# Patient Record
Sex: FEMALE | Race: WHITE | URBAN - METROPOLITAN AREA
[De-identification: names, ages, dates, MRNs, and addresses within clinical notes are randomized per-mention and may not be internally consistent; named-entity substitution may affect disease eponyms.]

---

## 2018-10-05 ENCOUNTER — APPOINTMENT (OUTPATIENT)
Dept: URBAN - METROPOLITAN AREA SURGERY 9 | Age: 48
Setting detail: DERMATOLOGY
End: 2018-10-06

## 2018-10-05 DIAGNOSIS — L98.8 OTHER SPECIFIED DISORDERS OF THE SKIN AND SUBCUTANEOUS TISSUE: ICD-10-CM

## 2018-10-05 PROCEDURE — OTHER BOTOX: OTHER

## 2019-02-25 ENCOUNTER — APPOINTMENT (OUTPATIENT)
Dept: URBAN - METROPOLITAN AREA SURGERY 9 | Age: 49
Setting detail: DERMATOLOGY
End: 2019-02-25

## 2019-02-25 DIAGNOSIS — Z41.9 ENCOUNTER FOR PROCEDURE FOR PURPOSES OTHER THAN REMEDYING HEALTH STATE, UNSPECIFIED: ICD-10-CM

## 2019-02-25 PROCEDURE — OTHER OTHER: OTHER

## 2019-02-25 PROCEDURE — OTHER BOTOX: OTHER

## 2019-02-25 NOTE — PROCEDURE: BOTOX
Price (Use Numbers Only, No Special Characters Or $): 251 Price (Use Numbers Only, No Special Characters Or $): 928

## 2019-06-06 ENCOUNTER — APPOINTMENT (OUTPATIENT)
Dept: URBAN - METROPOLITAN AREA SURGERY 9 | Age: 49
Setting detail: DERMATOLOGY
End: 2019-06-06

## 2019-06-06 DIAGNOSIS — B00.1 HERPESVIRAL VESICULAR DERMATITIS: ICD-10-CM

## 2019-06-06 DIAGNOSIS — L23.7 ALLERGIC CONTACT DERMATITIS DUE TO PLANTS, EXCEPT FOOD: ICD-10-CM

## 2019-06-06 DIAGNOSIS — L98.8 OTHER SPECIFIED DISORDERS OF THE SKIN AND SUBCUTANEOUS TISSUE: ICD-10-CM

## 2019-06-06 DIAGNOSIS — Z41.9 ENCOUNTER FOR PROCEDURE FOR PURPOSES OTHER THAN REMEDYING HEALTH STATE, UNSPECIFIED: ICD-10-CM

## 2019-06-06 PROCEDURE — OTHER COUNSELING: OTHER

## 2019-06-06 PROCEDURE — OTHER PRESCRIPTION: OTHER

## 2019-06-06 PROCEDURE — OTHER PRODUCT LINE (PHARMACEUTICALS): OTHER

## 2019-06-06 PROCEDURE — OTHER BOTOX: OTHER

## 2019-06-06 PROCEDURE — 99213 OFFICE O/P EST LOW 20 MIN: CPT

## 2019-06-06 RX ORDER — TRIAMCINOLONE ACETONIDE 1 MG/G
OINTMENT TOPICAL
Qty: 1 | Refills: 0 | Status: ERX

## 2019-06-06 RX ORDER — VALACYCLOVIR HYDROCHLORIDE 1 G/1
TABLET, FILM COATED ORAL
Qty: 4 | Refills: 2 | Status: ERX

## 2019-06-06 ASSESSMENT — LOCATION ZONE DERM
LOCATION ZONE: LIP
LOCATION ZONE: LEG

## 2019-06-06 ASSESSMENT — LOCATION SIMPLE DESCRIPTION DERM
LOCATION SIMPLE: RIGHT LIP
LOCATION SIMPLE: RIGHT PRETIBIAL REGION

## 2019-06-06 ASSESSMENT — LOCATION DETAILED DESCRIPTION DERM
LOCATION DETAILED: RIGHT ORAL COMMISSURE
LOCATION DETAILED: RIGHT PROXIMAL PRETIBIAL REGION

## 2019-06-06 NOTE — HPI: SECONDARY COMPLAINT
How Severe Is This Condition?: mild
How Severe Is This Condition?: moderate
Additional History: yard work; thinks that it is probably poison ivy

## 2019-06-06 NOTE — PROCEDURE: BOTOX
Price (Use Numbers Only, No Special Characters Or $): 737 Price (Use Numbers Only, No Special Characters Or $): 313

## 2019-06-06 NOTE — PROCEDURE: PRODUCT LINE (PHARMACEUTICALS)
Product 36 Units: 0
Product 24 Price (In Dollars - Numeric Only, No Special Characters Or $): 0.00
Product 7 Application Directions: Use as directed.  Bleaching cream handout given
Render Product Pricing In Note: Yes
Name Of Product 5: Tretinion 0.025% 20 g
Name Of Product 3: Latisse 5ML x 2
Product 5 Price (In Dollars - Numeric Only, No Special Characters Or $): 84.00
Name Of Product 1: Latisse 3ML
Product 3 Price (In Dollars - Numeric Only, No Special Characters Or $): 300
Product 5 Application Directions: Apply to affected area daily. Handout given
Product 1 Price (In Dollars - Numeric Only, No Special Characters Or $): 120
Product 3 Application Directions: apply as directed
Detail Level: Zone
Name Of Product 6: Tretinoin 0.05%
Product 6 Units: 1
Product 1 Application Directions: apply qd to affected area
Product 6 Application Directions: apply to affected areas qhs.  Retinoid handout given.  discontinue if pregnancy suspected
Name Of Product 4: Tretinoin 0.1%
Product 6 Price (In Dollars - Numeric Only, No Special Characters Or $): 91.00
Name Of Product 2: Latisse 5ML
Product 4 Price (In Dollars - Numeric Only, No Special Characters Or $): 102.00
Name Of Product 7: Hydro-Q 4%
Product 2 Price (In Dollars - Numeric Only, No Special Characters Or $): 179.00
Product 7 Price (In Dollars - Numeric Only, No Special Characters Or $): 69.90
Product 4 Application Directions: apply qhs to face; Retinoid handout given to patient

## 2019-09-17 ENCOUNTER — APPOINTMENT (OUTPATIENT)
Dept: URBAN - METROPOLITAN AREA SURGERY 9 | Age: 49
Setting detail: DERMATOLOGY
End: 2019-09-17

## 2019-09-17 DIAGNOSIS — L82.1 OTHER SEBORRHEIC KERATOSIS: ICD-10-CM

## 2019-09-17 DIAGNOSIS — L23.7 ALLERGIC CONTACT DERMATITIS DUE TO PLANTS, EXCEPT FOOD: ICD-10-CM

## 2019-09-17 DIAGNOSIS — D22 MELANOCYTIC NEVI: ICD-10-CM

## 2019-09-17 DIAGNOSIS — L82.0 INFLAMED SEBORRHEIC KERATOSIS: ICD-10-CM

## 2019-09-17 PROBLEM — D48.5 NEOPLASM OF UNCERTAIN BEHAVIOR OF SKIN: Status: ACTIVE | Noted: 2019-09-17

## 2019-09-17 PROBLEM — D22.5 MELANOCYTIC NEVI OF TRUNK: Status: ACTIVE | Noted: 2019-09-17

## 2019-09-17 PROCEDURE — OTHER COUNSELING: OTHER

## 2019-09-17 PROCEDURE — 11102 TANGNTL BX SKIN SINGLE LES: CPT

## 2019-09-17 PROCEDURE — OTHER PRESCRIPTION: OTHER

## 2019-09-17 PROCEDURE — OTHER TREATMENT REGIMEN: OTHER

## 2019-09-17 PROCEDURE — OTHER BIOPSY BY SHAVE METHOD: OTHER

## 2019-09-17 PROCEDURE — OTHER REASSURANCE: OTHER

## 2019-09-17 PROCEDURE — 99213 OFFICE O/P EST LOW 20 MIN: CPT | Mod: 25

## 2019-09-17 RX ORDER — TRIAMCINOLONE ACETONIDE 1 MG/G
OINTMENT TOPICAL
Qty: 1 | Refills: 0 | Status: ERX | COMMUNITY
Start: 2019-09-17

## 2019-09-17 ASSESSMENT — LOCATION SIMPLE DESCRIPTION DERM
LOCATION SIMPLE: RIGHT PRETIBIAL REGION
LOCATION SIMPLE: LEFT PRETIBIAL REGION
LOCATION SIMPLE: RIGHT LOWER BACK
LOCATION SIMPLE: LEFT UPPER ARM
LOCATION SIMPLE: RIGHT THIGH
LOCATION SIMPLE: RIGHT FOREARM
LOCATION SIMPLE: LEFT FOREARM

## 2019-09-17 ASSESSMENT — LOCATION DETAILED DESCRIPTION DERM
LOCATION DETAILED: LEFT DISTAL PRETIBIAL REGION
LOCATION DETAILED: RIGHT VENTRAL PROXIMAL FOREARM
LOCATION DETAILED: RIGHT DISTAL PRETIBIAL REGION
LOCATION DETAILED: LEFT VENTRAL PROXIMAL FOREARM
LOCATION DETAILED: RIGHT SUPERIOR LATERAL MIDBACK
LOCATION DETAILED: LEFT ANTERIOR DISTAL UPPER ARM
LOCATION DETAILED: RIGHT ANTERIOR DISTAL THIGH

## 2019-09-17 ASSESSMENT — LOCATION ZONE DERM
LOCATION ZONE: ARM
LOCATION ZONE: TRUNK
LOCATION ZONE: LEG

## 2019-09-17 NOTE — HPI: SKIN LESION
Is This A New Presentation, Or A Follow-Up?: Skin Lesion
Additional History: Denies pain, itching or bleeding.
Additional History: It was irritated and sore one time, possibly from bra strap.

## 2019-11-12 ENCOUNTER — RX ONLY (RX ONLY)
Age: 49
End: 2019-11-12

## 2019-11-12 RX ORDER — TRETINOIN 0.5 MG/G
CREAM TOPICAL
Qty: 1 | Refills: 3 | COMMUNITY
Start: 2019-11-12

## 2020-09-09 ENCOUNTER — APPOINTMENT (OUTPATIENT)
Dept: URBAN - METROPOLITAN AREA SURGERY 9 | Age: 50
Setting detail: DERMATOLOGY
End: 2020-09-09

## 2020-09-09 DIAGNOSIS — L98.8 OTHER SPECIFIED DISORDERS OF THE SKIN AND SUBCUTANEOUS TISSUE: ICD-10-CM

## 2020-09-09 PROCEDURE — OTHER BOTOX: OTHER

## 2020-09-09 PROCEDURE — OTHER PRODUCT LINE (PHARMACEUTICALS): OTHER

## 2020-09-09 PROCEDURE — OTHER PRESCRIPTION: OTHER

## 2020-09-09 RX ORDER — TRETIONIN 0.5 MG/G
CREAM TOPICAL
Qty: 1 | Refills: 5

## 2020-09-09 NOTE — PROCEDURE: PRODUCT LINE (PHARMACEUTICALS)
Product 21 Units: 0
Product 14 Price (In Dollars - Numeric Only, No Special Characters Or $): 0.00
Product 3 Price (In Dollars - Numeric Only, No Special Characters Or $): 300
Product 5 Application Directions: Apply to affected area daily. Handout given
Name Of Product 1: Latisse 3ML
Product 3 Application Directions: apply as directed
Name Of Product 6: Tretinoin 0.05%
Product 1 Price (In Dollars - Numeric Only, No Special Characters Or $): 120
Name Of Product 4: Tretinoin 0.1%
Product 6 Price (In Dollars - Numeric Only, No Special Characters Or $): 91.00
Detail Level: Zone
Product 4 Price (In Dollars - Numeric Only, No Special Characters Or $): 102.00
Product 1 Application Directions: apply qd to affected area
Product 6 Units: 1
Product 6 Application Directions: apply pea sized amount to affected areas qhs.  Retinoid handout given.  discontinue if pregnancy suspected
Name Of Product 2: Latisse 5ML
Product 4 Application Directions: apply qhs to face; Retinoid handout given to patient
Allow Plan To Count Towards E/M Coding: Yes
Product 2 Price (In Dollars - Numeric Only, No Special Characters Or $): 179.00
Name Of Product 7: Hydro-Q 4%
Name Of Product 5: Tretinion 0.025% 20 g
Product 7 Price (In Dollars - Numeric Only, No Special Characters Or $): 69.90
Product 5 Price (In Dollars - Numeric Only, No Special Characters Or $): 84.00
Name Of Product 3: Latisse 5ML x 2
Product 7 Application Directions: Use as directed.  Bleaching cream handout given

## 2020-12-23 ENCOUNTER — APPOINTMENT (OUTPATIENT)
Dept: URBAN - METROPOLITAN AREA SURGERY 9 | Age: 50
Setting detail: DERMATOLOGY
End: 2020-12-23

## 2020-12-23 DIAGNOSIS — Z41.9 ENCOUNTER FOR PROCEDURE FOR PURPOSES OTHER THAN REMEDYING HEALTH STATE, UNSPECIFIED: ICD-10-CM

## 2020-12-23 PROCEDURE — OTHER BOTOX: OTHER

## 2021-08-31 ENCOUNTER — APPOINTMENT (OUTPATIENT)
Dept: URBAN - METROPOLITAN AREA SURGERY 9 | Age: 51
Setting detail: DERMATOLOGY
End: 2021-08-31

## 2021-08-31 DIAGNOSIS — L98.8 OTHER SPECIFIED DISORDERS OF THE SKIN AND SUBCUTANEOUS TISSUE: ICD-10-CM

## 2021-08-31 DIAGNOSIS — Z41.9 ENCOUNTER FOR PROCEDURE FOR PURPOSES OTHER THAN REMEDYING HEALTH STATE, UNSPECIFIED: ICD-10-CM

## 2021-08-31 PROCEDURE — OTHER PRODUCT LINE (PHARMACEUTICALS): OTHER

## 2021-08-31 PROCEDURE — OTHER BOTOX: OTHER

## 2021-12-02 ENCOUNTER — APPOINTMENT (OUTPATIENT)
Dept: URBAN - METROPOLITAN AREA SURGERY 9 | Age: 51
Setting detail: DERMATOLOGY
End: 2021-12-02

## 2021-12-02 DIAGNOSIS — T88.7XX: ICD-10-CM

## 2021-12-02 PROBLEM — L30.9 DERMATITIS, UNSPECIFIED: Status: ACTIVE | Noted: 2021-12-02

## 2021-12-02 PROCEDURE — OTHER COUNSELING: OTHER

## 2021-12-02 PROCEDURE — OTHER PRESCRIPTION: OTHER

## 2021-12-02 PROCEDURE — OTHER PRESCRIPTION MEDICATION MANAGEMENT: OTHER

## 2021-12-02 PROCEDURE — 99213 OFFICE O/P EST LOW 20 MIN: CPT

## 2021-12-02 RX ORDER — PREDNISONE 10 MG/1
TABLET ORAL
Qty: 30 | Refills: 0 | Status: ERX | COMMUNITY
Start: 2021-12-02

## 2021-12-02 RX ORDER — CLOBETASOL PROPIONATE 0.5 MG/G
CREAM TOPICAL BID
Qty: 60 | Refills: 1 | Status: ERX | COMMUNITY
Start: 2021-12-02

## 2021-12-02 ASSESSMENT — LOCATION SIMPLE DESCRIPTION DERM
LOCATION SIMPLE: RIGHT UPPER ARM
LOCATION SIMPLE: RIGHT UPPER BACK
LOCATION SIMPLE: POSTERIOR NECK
LOCATION SIMPLE: UPPER BACK
LOCATION SIMPLE: LEFT LOWER BACK

## 2021-12-02 ASSESSMENT — LOCATION DETAILED DESCRIPTION DERM
LOCATION DETAILED: LEFT INFERIOR LATERAL LOWER BACK
LOCATION DETAILED: LEFT INFERIOR POSTERIOR NECK
LOCATION DETAILED: INFERIOR THORACIC SPINE
LOCATION DETAILED: RIGHT MEDIAL UPPER BACK
LOCATION DETAILED: RIGHT ANTERIOR DISTAL UPPER ARM

## 2021-12-02 ASSESSMENT — LOCATION ZONE DERM
LOCATION ZONE: TRUNK
LOCATION ZONE: ARM
LOCATION ZONE: NECK

## 2021-12-02 NOTE — PROCEDURE: PRESCRIPTION MEDICATION MANAGEMENT
Render In Strict Bullet Format?: No
Detail Level: Simple
Initiate Treatment: Clobetasol cream \\nPrednisone \\nAllegra 150mg daily \\nSarna itch lotion as much as needed\\nMineral sunscreen
Plan: Will start with new topical and Allegra, if not better she will have prednisone on hand if wor[place]
Discontinue Regimen: Triamcinolone

## 2021-12-02 NOTE — HPI: RASH
Is This A New Presentation, Or A Follow-Up?: Rash
Additional History: Only change is detergent was using gain liquid but changed to the pods 2 months ago. Went to urgent care last Friday, thought it was poison ivy and gave steroid shot. Feels the steroid shot did not help. Rash is worsening. Wants to know if it is heat induced.

## 2021-12-16 ENCOUNTER — APPOINTMENT (OUTPATIENT)
Dept: URBAN - METROPOLITAN AREA SURGERY 9 | Age: 51
Setting detail: DERMATOLOGY
End: 2021-12-17

## 2021-12-16 DIAGNOSIS — L29.8 OTHER PRURITUS: ICD-10-CM

## 2021-12-16 DIAGNOSIS — T88.7XX: ICD-10-CM

## 2021-12-16 PROBLEM — L30.9 DERMATITIS, UNSPECIFIED: Status: ACTIVE | Noted: 2021-12-16

## 2021-12-16 PROCEDURE — OTHER PRESCRIPTION: OTHER

## 2021-12-16 PROCEDURE — 99213 OFFICE O/P EST LOW 20 MIN: CPT | Mod: 25

## 2021-12-16 PROCEDURE — OTHER PRESCRIPTION MEDICATION MANAGEMENT: OTHER

## 2021-12-16 PROCEDURE — 11104 PUNCH BX SKIN SINGLE LESION: CPT

## 2021-12-16 PROCEDURE — OTHER COUNSELING: OTHER

## 2021-12-16 PROCEDURE — OTHER BIOPSY BY PUNCH METHOD: OTHER

## 2021-12-16 RX ORDER — CLOBETASOL PROPIONATE 0.5 MG/ML
SOLUTION TOPICAL
Qty: 50 | Refills: 1 | Status: ERX | COMMUNITY
Start: 2021-12-16

## 2021-12-16 ASSESSMENT — LOCATION ZONE DERM
LOCATION ZONE: TRUNK
LOCATION ZONE: LEG
LOCATION ZONE: ARM

## 2021-12-16 ASSESSMENT — LOCATION DETAILED DESCRIPTION DERM
LOCATION DETAILED: LEFT PROXIMAL POSTERIOR UPPER ARM
LOCATION DETAILED: RIGHT PROXIMAL POSTERIOR UPPER ARM
LOCATION DETAILED: RIGHT PROXIMAL POSTERIOR THIGH
LOCATION DETAILED: LEFT PROXIMAL POSTERIOR THIGH
LOCATION DETAILED: LEFT SUPERIOR LATERAL MIDBACK
LOCATION DETAILED: LEFT RIB CAGE
LOCATION DETAILED: RIGHT CLAVICULAR SKIN

## 2021-12-16 ASSESSMENT — LOCATION SIMPLE DESCRIPTION DERM
LOCATION SIMPLE: RIGHT POSTERIOR UPPER ARM
LOCATION SIMPLE: RIGHT CLAVICULAR SKIN
LOCATION SIMPLE: LEFT LOWER BACK
LOCATION SIMPLE: LEFT POSTERIOR UPPER ARM
LOCATION SIMPLE: LEFT POSTERIOR THIGH
LOCATION SIMPLE: RIGHT POSTERIOR THIGH
LOCATION SIMPLE: ABDOMEN

## 2021-12-16 NOTE — PROCEDURE: BIOPSY BY PUNCH METHOD
POST COLONOSCOPY CARE     During your procedure today we found a healthy colon.   ?   You may experience abdominal bloating or cramps due to air used to inflate the colon during the procedure. This is common and can be relieved by walking, lying on your left side with knees bent, and passing gas. A small amount of rectal bleeding may occur, especially if polyps or biopsies were removed. Do not use laxatives or enemas for one week following your procedure.     Post Upper Endoscopy     During your procedure today we noticed some superficial erosions. We took some biopsies and a MACIEJ test.     You may experience abdominal bloating or cramps due to air used to inflate the stomach during the procedure.   This is common and can be relieved by walking, belching, and passing gas.   Salt-water gargles and lozenges can help if you have a sore throat.     If any of the following problems occur, call us at [phone].     Severe pain/ discomfort in abdomen   Difficulty swallowing   Nausea and/or vomiting   Temperature above 101 degrees F   New/ increased bleeding from mouth or rectum, or black, tarry stools   Abdominal bloating not relieved by belching or passing gas   Chest pain or shortness of breath       Follow Up Plan:     1. Resume your regular diet, as tolerated. Increase your fiber intake  2. Avoid Aspirin and arthritis medication like, Ibuprofen and Aleve. Take tylenol for pain  3. Start Pantoprazole 2x a day. 30 minutes before breakfast and 30 minutes before supper for 2 months  4. Clinic will follow up with the results within 1 week.      Due to sedation you received, you may not remember the procedure or the doctor’s explanation afterward.     Our medications sometimes cause dizziness, drowsiness and impaired judgment.   Therefore, please follow these recommendations for the next 24 hours.     Do not drive a car or operate any machinery.   Have a responsible adult drive you home.   Do not make critical decisions or  sign any legal documents.   Do not drink alcohol   Do not return to work, or perform any tasks that require coordinated activity     We would like to take this opportunity to thank you for choosing [facility]. Because your confidence in your caregivers is very important to us, it is our commitment to always treat you and your family with courtesy and respect. Our goal is to always answer any questions or worries or concerns you may have about the care you received If you have any suggestions for improvement or worries or concerns please do not hesitate to let us know.          Anesthesia Volume In Cc (Will Not Render If 0): 0.8

## 2021-12-16 NOTE — PROCEDURE: PRESCRIPTION MEDICATION MANAGEMENT
Discontinue Regimen: Stop all vitamins and supplements, all fragrances.
Continue Regimen: Sarna lotion as needed\\nAllegra
Detail Level: Simple
Render In Strict Bullet Format?: No
Initiate Treatment: Clobetasol scalp solution mix with tub of Vanicream moisturizer apply mixture twice a day for 2 weeks. \\nUse only Vanicream products. \\Amanda free and clear detergent.
Plan: Patient declined oral prednisone today, may call for a prescription if she changes her mind.

## 2021-12-29 RX ORDER — CLOBETASOL PROPIONATE 0.5 MG/ML
SOLUTION TOPICAL
Qty: 50 | Refills: 1 | Status: ERX | COMMUNITY
Start: 2021-12-29

## 2021-12-30 ENCOUNTER — APPOINTMENT (OUTPATIENT)
Dept: URBAN - METROPOLITAN AREA SURGERY 9 | Age: 51
Setting detail: DERMATOLOGY
End: 2021-12-30

## 2021-12-30 DIAGNOSIS — L20.84 INTRINSIC (ALLERGIC) ECZEMA: ICD-10-CM

## 2021-12-30 DIAGNOSIS — L29.8 OTHER PRURITUS: ICD-10-CM

## 2021-12-30 PROBLEM — L30.9 DERMATITIS, UNSPECIFIED: Status: ACTIVE | Noted: 2021-12-30

## 2021-12-30 PROCEDURE — OTHER PRESCRIPTION MEDICATION MANAGEMENT: OTHER

## 2021-12-30 PROCEDURE — OTHER PRESCRIPTION: OTHER

## 2021-12-30 PROCEDURE — OTHER COUNSELING: OTHER

## 2021-12-30 PROCEDURE — 99024 POSTOP FOLLOW-UP VISIT: CPT

## 2021-12-30 PROCEDURE — 99212 OFFICE O/P EST SF 10 MIN: CPT

## 2021-12-30 PROCEDURE — OTHER SUTURE REMOVAL (GLOBAL PERIOD): OTHER

## 2021-12-30 ASSESSMENT — LOCATION DETAILED DESCRIPTION DERM
LOCATION DETAILED: LEFT SUPERIOR LATERAL MIDBACK
LOCATION DETAILED: RIGHT CLAVICULAR SKIN

## 2021-12-30 ASSESSMENT — LOCATION ZONE DERM: LOCATION ZONE: TRUNK

## 2021-12-30 ASSESSMENT — LOCATION SIMPLE DESCRIPTION DERM
LOCATION SIMPLE: LEFT LOWER BACK
LOCATION SIMPLE: RIGHT CLAVICULAR SKIN

## 2021-12-30 NOTE — PROCEDURE: PRESCRIPTION MEDICATION MANAGEMENT
Discontinue Regimen: Stop all vitamins and supplements, all fragrances.
Continue Regimen: Sarna lotion as needed\\nAllegra \\nClobetasol scalp solution mix with tub of Vanicream moisturizer apply mixture twice a day for 2 weeks — as needed flares \\nUse only Vanicream products. \\Amanda free and clear detergent.
Detail Level: Simple
Render In Strict Bullet Format?: No
Plan: Very suspicious for contact dermatitis. Will refer pt for patch testing

## 2021-12-30 NOTE — PROCEDURE: SUTURE REMOVAL (GLOBAL PERIOD)
Detail Level: Detailed
Add 90683 Cpt? (Important Note: In 2017 The Use Of 81341 Is Being Tracked By Cms To Determine Future Global Period Reimbursement For Global Periods): yes

## 2022-12-14 NOTE — PROCEDURE: BOTOX
Detail Level: Detailed Alert-The patient is alert, awake and responds to voice. The patient is oriented to time, place, and person. The triage nurse is able to obtain subjective information.

## 2023-01-03 ENCOUNTER — APPOINTMENT (OUTPATIENT)
Dept: URBAN - METROPOLITAN AREA SURGERY 9 | Age: 53
Setting detail: DERMATOLOGY
End: 2023-01-03

## 2023-01-03 DIAGNOSIS — Z41.9 ENCOUNTER FOR PROCEDURE FOR PURPOSES OTHER THAN REMEDYING HEALTH STATE, UNSPECIFIED: ICD-10-CM

## 2023-01-03 PROCEDURE — OTHER BOTOX: OTHER

## 2023-01-03 ASSESSMENT — LOCATION ZONE DERM: LOCATION ZONE: FACE

## 2023-01-03 ASSESSMENT — LOCATION SIMPLE DESCRIPTION DERM: LOCATION SIMPLE: INFERIOR FOREHEAD

## 2023-01-03 ASSESSMENT — LOCATION DETAILED DESCRIPTION DERM: LOCATION DETAILED: INFERIOR MID FOREHEAD

## 2023-01-03 NOTE — PROCEDURE: BOTOX
Price (Use Numbers Only, No Special Characters Or $): 907 Price (Use Numbers Only, No Special Characters Or $): 324

## 2023-04-18 ENCOUNTER — APPOINTMENT (OUTPATIENT)
Dept: URBAN - METROPOLITAN AREA SURGERY 9 | Age: 53
Setting detail: DERMATOLOGY
End: 2023-04-18

## 2023-04-18 DIAGNOSIS — Z41.9 ENCOUNTER FOR PROCEDURE FOR PURPOSES OTHER THAN REMEDYING HEALTH STATE, UNSPECIFIED: ICD-10-CM

## 2023-04-18 PROCEDURE — OTHER BOTOX: OTHER

## 2023-04-18 PROCEDURE — OTHER INVENTORY: OTHER

## 2023-04-18 NOTE — PROCEDURE: BOTOX
Depressor Anguli Oris Units: 0
Additional Area 5 Location: orbicularis oris
Show Levator Superior Units: Yes
Additional Area 2 Location: lower lip
Consent: Written consent obtained. Risks include but not limited to lid/brow ptosis, bruising, swelling, diplopia, temporary effect, incomplete chemical denervation.
Glabellar Complex Units: 6
Post-Care Instructions: Avoid exercise, inversion, massage or facials x 4-8 hours. Skin care regimen discussed
Incrementing Botox Units: By 0.5 Units
Additional Area 4 Location: temporal fusion lines
Lot #: [id number]
Additional Area 1 Location: upper lip
Detail Level: Detailed
Dilution (U/0.1 Cc): 4
Additional Area 6 Location: temporalis
Show Inventory Tab: Show
Expiration Date (Month Year): 7/2025
Additional Area 3 Location: [facility]
Periorbital Skin Units: 12

## 2024-01-19 ENCOUNTER — APPOINTMENT (OUTPATIENT)
Dept: URBAN - METROPOLITAN AREA SURGERY 9 | Age: 54
Setting detail: DERMATOLOGY
End: 2024-01-19

## 2024-01-19 DIAGNOSIS — Z41.9 ENCOUNTER FOR PROCEDURE FOR PURPOSES OTHER THAN REMEDYING HEALTH STATE, UNSPECIFIED: ICD-10-CM

## 2024-01-19 PROCEDURE — OTHER BOTOX: OTHER

## 2024-01-19 NOTE — PROCEDURE: BOTOX
Forehead Units: 2
Right Periorbital Skin Units: 0
Lot #: [id number]
Additional Area 4 Location: temporal fusion lines
Show Orbicularis Oculi Units: Yes
Show Inventory Tab: Show
Consent: Written consent obtained. Risks include but not limited to lid/brow ptosis, bruising, swelling, diplopia, temporary effect, incomplete chemical denervation.
Dilution (U/0.1 Cc): 4
Post-Care Instructions: Avoid exercise, inversion, massage or facials x 4-8 hours. Skin care regimen discussed
Additional Area 6 Location: temporalis
Additional Area 3 Location: [facility]
Periorbital Skin Units: 12
Additional Area 2 Location: lower lip
Additional Area 5 Location: orbicularis oris
Expiration Date (Month Year): 7/2025
Incrementing Botox Units: By 0.5 Units
Detail Level: Detailed
Additional Area 1 Location: upper lip

## 2024-06-20 ENCOUNTER — APPOINTMENT (OUTPATIENT)
Dept: URBAN - METROPOLITAN AREA SURGERY 9 | Age: 54
Setting detail: DERMATOLOGY
End: 2024-06-20

## 2024-06-20 DIAGNOSIS — Z41.9 ENCOUNTER FOR PROCEDURE FOR PURPOSES OTHER THAN REMEDYING HEALTH STATE, UNSPECIFIED: ICD-10-CM

## 2024-06-20 PROCEDURE — OTHER BOTOX: OTHER

## 2024-06-20 PROCEDURE — OTHER INVENTORY: OTHER

## 2024-06-20 PROCEDURE — OTHER OTHER: OTHER

## 2024-06-20 NOTE — PROCEDURE: OTHER
Render Risk Assessment In Note?: no
Note Text (......Xxx Chief Complaint.): This diagnosis correlates with the
Detail Level: Detailed
Other (Free Text): reviewed potential impact of GLP-1 receptor agonists on skin health and appearance. Stressed importance of continued SPF 30 or higher, consistent application, and tretinoin to maintain collagen health. Pt verbalizes understanding.

## 2024-06-20 NOTE — PROCEDURE: BOTOX
Forehead Units: 6
Right Periorbital Skin Units: 0
Lot #: [id number]
Additional Area 4 Location: temporal fusion lines
Show Orbicularis Oculi Units: Yes
Show Inventory Tab: Show
Consent: Written consent obtained. Risks include but not limited to lid/brow ptosis, bruising, swelling, diplopia, temporary effect, incomplete chemical denervation.
Dilution (U/0.1 Cc): 4
Post-Care Instructions: Avoid exercise, inversion, massage or facials x 4-8 hours. Skin care regimen discussed
Additional Area 6 Location: temporalis
Additional Area 3 Location: [facility] [place]
Periorbital Skin Units: 12
Additional Area 2 Location: lower lip
Additional Area 5 Location: orbicularis oris
Expiration Date (Month Year): 7/2025
Incrementing Botox Units: By 0.5 Units
Detail Level: Detailed
Additional Area 1 Location: upper lip

## 2024-09-23 ENCOUNTER — APPOINTMENT (OUTPATIENT)
Dept: URBAN - METROPOLITAN AREA SURGERY 9 | Age: 54
Setting detail: DERMATOLOGY
End: 2024-09-23

## 2024-09-23 DIAGNOSIS — Z41.9 ENCOUNTER FOR PROCEDURE FOR PURPOSES OTHER THAN REMEDYING HEALTH STATE, UNSPECIFIED: ICD-10-CM

## 2024-09-23 PROCEDURE — OTHER BOTOX: OTHER

## 2024-09-23 ASSESSMENT — LOCATION SIMPLE DESCRIPTION DERM: LOCATION SIMPLE: RIGHT FOREHEAD

## 2024-09-23 ASSESSMENT — LOCATION ZONE DERM: LOCATION ZONE: FACE

## 2024-09-23 ASSESSMENT — LOCATION DETAILED DESCRIPTION DERM: LOCATION DETAILED: RIGHT INFERIOR MEDIAL FOREHEAD

## 2024-09-23 NOTE — PROCEDURE: BOTOX
Glabellar Complex Units: 0
Show Lateral Platysmal Band Units: Yes
Detail Level: Detailed
Comments: staff Botox allotment used
Additional Area 6 Location: massesters
Additional Area 3 Location: [facility]
Forehead Units: 4
Show Inventory Tab: Show
Additional Area 5 Location: orbicularis oris
Expiration Date (Month Year): 7/2025
Periorbital Skin Units: 12
Additional Area 2 Location: lower lip
Additional Area 4 Location: temporal fusion lines
Consent: Written consent obtained. Risks include but not limited to lid/brow ptosis, bruising, swelling, diplopia, temporary effect, incomplete chemical denervation.
Lot #: [id number]
Glabellar Complex Units: 10
Post-Care Instructions: Avoid exercise, inversion, massage or facials x 4-8 hours. Skin care regimen discussed
Incrementing Botox Units: By 0.5 Units

## 2025-05-23 ENCOUNTER — APPOINTMENT (OUTPATIENT)
Dept: URBAN - METROPOLITAN AREA SURGERY 9 | Age: 55
Setting detail: DERMATOLOGY
End: 2025-05-27

## 2025-05-23 DIAGNOSIS — Z41.9 ENCOUNTER FOR PROCEDURE FOR PURPOSES OTHER THAN REMEDYING HEALTH STATE, UNSPECIFIED: ICD-10-CM

## 2025-05-23 PROCEDURE — OTHER BOTOX: OTHER

## 2025-05-23 ASSESSMENT — LOCATION ZONE DERM: LOCATION ZONE: FACE

## 2025-05-23 ASSESSMENT — LOCATION SIMPLE DESCRIPTION DERM: LOCATION SIMPLE: RIGHT FOREHEAD

## 2025-05-23 ASSESSMENT — LOCATION DETAILED DESCRIPTION DERM: LOCATION DETAILED: RIGHT INFERIOR MEDIAL FOREHEAD
